# Patient Record
Sex: FEMALE | Race: WHITE | NOT HISPANIC OR LATINO | URBAN - METROPOLITAN AREA
[De-identification: names, ages, dates, MRNs, and addresses within clinical notes are randomized per-mention and may not be internally consistent; named-entity substitution may affect disease eponyms.]

---

## 2017-02-08 ENCOUNTER — EMERGENCY (EMERGENCY)
Facility: HOSPITAL | Age: 37
LOS: 1 days | Discharge: HOME | End: 2017-02-08
Attending: EMERGENCY MEDICINE
Payer: MEDICAID

## 2017-02-08 VITALS
DIASTOLIC BLOOD PRESSURE: 79 MMHG | HEART RATE: 92 BPM | RESPIRATION RATE: 20 BRPM | TEMPERATURE: 98 F | OXYGEN SATURATION: 98 % | SYSTOLIC BLOOD PRESSURE: 116 MMHG

## 2017-02-08 VITALS
OXYGEN SATURATION: 98 % | TEMPERATURE: 98 F | DIASTOLIC BLOOD PRESSURE: 100 MMHG | HEIGHT: 59 IN | SYSTOLIC BLOOD PRESSURE: 133 MMHG | HEART RATE: 105 BPM | WEIGHT: 130.07 LBS | RESPIRATION RATE: 982 BRPM

## 2017-02-08 DIAGNOSIS — G40.909 EPILEPSY, UNSPECIFIED, NOT INTRACTABLE, WITHOUT STATUS EPILEPTICUS: ICD-10-CM

## 2017-02-08 DIAGNOSIS — Z85.41 PERSONAL HISTORY OF MALIGNANT NEOPLASM OF CERVIX UTERI: ICD-10-CM

## 2017-02-08 DIAGNOSIS — K80.50 CALCULUS OF BILE DUCT WITHOUT CHOLANGITIS OR CHOLECYSTITIS WITHOUT OBSTRUCTION: ICD-10-CM

## 2017-02-08 DIAGNOSIS — Z59.0 HOMELESSNESS: ICD-10-CM

## 2017-02-08 DIAGNOSIS — K29.70 GASTRITIS, UNSPECIFIED, WITHOUT BLEEDING: ICD-10-CM

## 2017-02-08 DIAGNOSIS — Z91.14 PATIENT'S OTHER NONCOMPLIANCE WITH MEDICATION REGIMEN: ICD-10-CM

## 2017-02-08 DIAGNOSIS — Z88.2 ALLERGY STATUS TO SULFONAMIDES: ICD-10-CM

## 2017-02-08 LAB
ALBUMIN SERPL ELPH-MCNC: 4.3 G/DL — SIGNIFICANT CHANGE UP (ref 3.5–5)
ALP SERPL-CCNC: 110 U/L — SIGNIFICANT CHANGE UP (ref 40–120)
ALT FLD-CCNC: 15 U/L DA — SIGNIFICANT CHANGE UP (ref 10–60)
ANION GAP SERPL CALC-SCNC: 10 MMOL/L — SIGNIFICANT CHANGE UP (ref 5–17)
APPEARANCE UR: CLEAR — SIGNIFICANT CHANGE UP
AST SERPL-CCNC: 11 U/L — SIGNIFICANT CHANGE UP (ref 10–40)
BASOPHILS # BLD AUTO: 0.1 K/UL — SIGNIFICANT CHANGE UP (ref 0–0.2)
BASOPHILS NFR BLD AUTO: 0.8 % — SIGNIFICANT CHANGE UP (ref 0–2)
BILIRUB SERPL-MCNC: 0.3 MG/DL — SIGNIFICANT CHANGE UP (ref 0.2–1.2)
BILIRUB UR-MCNC: NEGATIVE — SIGNIFICANT CHANGE UP
BUN SERPL-MCNC: 7 MG/DL — SIGNIFICANT CHANGE UP (ref 7–18)
CALCIUM SERPL-MCNC: 9.2 MG/DL — SIGNIFICANT CHANGE UP (ref 8.4–10.5)
CHLORIDE SERPL-SCNC: 109 MMOL/L — HIGH (ref 96–108)
CO2 SERPL-SCNC: 17 MMOL/L — LOW (ref 22–31)
COLOR SPEC: YELLOW — SIGNIFICANT CHANGE UP
CREAT SERPL-MCNC: 0.97 MG/DL — SIGNIFICANT CHANGE UP (ref 0.5–1.3)
DIFF PNL FLD: NEGATIVE — SIGNIFICANT CHANGE UP
EOSINOPHIL # BLD AUTO: 0.4 K/UL — SIGNIFICANT CHANGE UP (ref 0–0.5)
EOSINOPHIL NFR BLD AUTO: 3.5 % — SIGNIFICANT CHANGE UP (ref 0–6)
GLUCOSE SERPL-MCNC: 89 MG/DL — SIGNIFICANT CHANGE UP (ref 70–99)
GLUCOSE UR QL: NEGATIVE — SIGNIFICANT CHANGE UP
HCG SERPL-ACNC: <1 MIU/ML — SIGNIFICANT CHANGE UP
HCT VFR BLD CALC: 43.6 % — SIGNIFICANT CHANGE UP (ref 34.5–45)
HGB BLD-MCNC: 14.3 G/DL — SIGNIFICANT CHANGE UP (ref 11.5–15.5)
KETONES UR-MCNC: NEGATIVE — SIGNIFICANT CHANGE UP
LEUKOCYTE ESTERASE UR-ACNC: ABNORMAL
LIDOCAIN IGE QN: 98 U/L — SIGNIFICANT CHANGE UP (ref 73–393)
LYMPHOCYTES # BLD AUTO: 18.4 % — SIGNIFICANT CHANGE UP (ref 13–44)
LYMPHOCYTES # BLD AUTO: 2.2 K/UL — SIGNIFICANT CHANGE UP (ref 1–3.3)
MCHC RBC-ENTMCNC: 29.1 PG — SIGNIFICANT CHANGE UP (ref 27–34)
MCHC RBC-ENTMCNC: 32.8 GM/DL — SIGNIFICANT CHANGE UP (ref 32–36)
MCV RBC AUTO: 88.7 FL — SIGNIFICANT CHANGE UP (ref 80–100)
MONOCYTES # BLD AUTO: 0.7 K/UL — SIGNIFICANT CHANGE UP (ref 0–0.9)
MONOCYTES NFR BLD AUTO: 6.1 % — SIGNIFICANT CHANGE UP (ref 2–14)
NEUTROPHILS # BLD AUTO: 8.6 K/UL — HIGH (ref 1.8–7.4)
NEUTROPHILS NFR BLD AUTO: 71.2 % — SIGNIFICANT CHANGE UP (ref 43–77)
NITRITE UR-MCNC: NEGATIVE — SIGNIFICANT CHANGE UP
PH UR: 7 — SIGNIFICANT CHANGE UP (ref 4.8–8)
PLATELET # BLD AUTO: 326 K/UL — SIGNIFICANT CHANGE UP (ref 150–400)
POTASSIUM SERPL-MCNC: 3.7 MMOL/L — SIGNIFICANT CHANGE UP (ref 3.5–5.3)
POTASSIUM SERPL-SCNC: 3.7 MMOL/L — SIGNIFICANT CHANGE UP (ref 3.5–5.3)
PROT SERPL-MCNC: 8.5 G/DL — HIGH (ref 6–8.3)
PROT UR-MCNC: NEGATIVE — SIGNIFICANT CHANGE UP
RBC # BLD: 4.92 M/UL — SIGNIFICANT CHANGE UP (ref 3.8–5.2)
RBC # FLD: 12.6 % — SIGNIFICANT CHANGE UP (ref 10.3–14.5)
SODIUM SERPL-SCNC: 136 MMOL/L — SIGNIFICANT CHANGE UP (ref 135–145)
SP GR SPEC: 1.01 — SIGNIFICANT CHANGE UP (ref 1.01–1.02)
UROBILINOGEN FLD QL: NEGATIVE — SIGNIFICANT CHANGE UP
WBC # BLD: 12.1 K/UL — HIGH (ref 3.8–10.5)
WBC # FLD AUTO: 12.1 K/UL — HIGH (ref 3.8–10.5)

## 2017-02-08 PROCEDURE — 99285 EMERGENCY DEPT VISIT HI MDM: CPT

## 2017-02-08 PROCEDURE — 99284 EMERGENCY DEPT VISIT MOD MDM: CPT | Mod: 25

## 2017-02-08 PROCEDURE — 96376 TX/PRO/DX INJ SAME DRUG ADON: CPT

## 2017-02-08 PROCEDURE — 76705 ECHO EXAM OF ABDOMEN: CPT

## 2017-02-08 PROCEDURE — 83690 ASSAY OF LIPASE: CPT

## 2017-02-08 PROCEDURE — 84702 CHORIONIC GONADOTROPIN TEST: CPT

## 2017-02-08 PROCEDURE — 96374 THER/PROPH/DIAG INJ IV PUSH: CPT

## 2017-02-08 PROCEDURE — 85027 COMPLETE CBC AUTOMATED: CPT

## 2017-02-08 PROCEDURE — 74176 CT ABD & PELVIS W/O CONTRAST: CPT

## 2017-02-08 PROCEDURE — 96375 TX/PRO/DX INJ NEW DRUG ADDON: CPT

## 2017-02-08 PROCEDURE — 80053 COMPREHEN METABOLIC PANEL: CPT

## 2017-02-08 PROCEDURE — 76705 ECHO EXAM OF ABDOMEN: CPT | Mod: 26

## 2017-02-08 PROCEDURE — 81001 URINALYSIS AUTO W/SCOPE: CPT

## 2017-02-08 PROCEDURE — 74176 CT ABD & PELVIS W/O CONTRAST: CPT | Mod: 26

## 2017-02-08 RX ORDER — MORPHINE SULFATE 50 MG/1
4 CAPSULE, EXTENDED RELEASE ORAL ONCE
Qty: 0 | Refills: 0 | Status: DISCONTINUED | OUTPATIENT
Start: 2017-02-08 | End: 2017-02-08

## 2017-02-08 RX ORDER — LEVETIRACETAM 250 MG/1
1 TABLET, FILM COATED ORAL
Qty: 0 | Refills: 0 | COMMUNITY

## 2017-02-08 RX ORDER — LAMOTRIGINE 25 MG/1
0 TABLET, ORALLY DISINTEGRATING ORAL
Qty: 0 | Refills: 0 | COMMUNITY

## 2017-02-08 RX ORDER — ONDANSETRON 8 MG/1
4 TABLET, FILM COATED ORAL ONCE
Qty: 0 | Refills: 0 | Status: COMPLETED | OUTPATIENT
Start: 2017-02-08 | End: 2017-02-08

## 2017-02-08 RX ORDER — FAMOTIDINE 10 MG/ML
0 INJECTION INTRAVENOUS
Qty: 0 | Refills: 0 | COMMUNITY

## 2017-02-08 RX ORDER — LEVETIRACETAM 250 MG/1
1000 TABLET, FILM COATED ORAL ONCE
Qty: 0 | Refills: 0 | Status: COMPLETED | OUTPATIENT
Start: 2017-02-08 | End: 2017-02-08

## 2017-02-08 RX ORDER — SODIUM CHLORIDE 9 MG/ML
1000 INJECTION INTRAMUSCULAR; INTRAVENOUS; SUBCUTANEOUS ONCE
Qty: 0 | Refills: 0 | Status: COMPLETED | OUTPATIENT
Start: 2017-02-08 | End: 2017-02-08

## 2017-02-08 RX ORDER — LAMOTRIGINE 25 MG/1
200 TABLET, ORALLY DISINTEGRATING ORAL ONCE
Qty: 0 | Refills: 0 | Status: COMPLETED | OUTPATIENT
Start: 2017-02-08 | End: 2017-02-08

## 2017-02-08 RX ORDER — TOPIRAMATE 25 MG
0 TABLET ORAL
Qty: 0 | Refills: 0 | COMMUNITY

## 2017-02-08 RX ADMIN — MORPHINE SULFATE 4 MILLIGRAM(S): 50 CAPSULE, EXTENDED RELEASE ORAL at 19:52

## 2017-02-08 RX ADMIN — LAMOTRIGINE 200 MILLIGRAM(S): 25 TABLET, ORALLY DISINTEGRATING ORAL at 20:09

## 2017-02-08 RX ADMIN — ONDANSETRON 4 MILLIGRAM(S): 8 TABLET, FILM COATED ORAL at 16:44

## 2017-02-08 RX ADMIN — SODIUM CHLORIDE 1000 MILLILITER(S): 9 INJECTION INTRAMUSCULAR; INTRAVENOUS; SUBCUTANEOUS at 16:02

## 2017-02-08 RX ADMIN — MORPHINE SULFATE 4 MILLIGRAM(S): 50 CAPSULE, EXTENDED RELEASE ORAL at 16:44

## 2017-02-08 RX ADMIN — ONDANSETRON 4 MILLIGRAM(S): 8 TABLET, FILM COATED ORAL at 19:52

## 2017-02-08 RX ADMIN — LEVETIRACETAM 400 MILLIGRAM(S): 250 TABLET, FILM COATED ORAL at 16:30

## 2017-02-08 SDOH — ECONOMIC STABILITY - HOUSING INSECURITY: HOMELESSNESS: Z59.0
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
## 2017-02-08 NOTE — ED PROVIDER NOTE - MEDICAL DECISION MAKING DETAILS
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 Will order pain management, surgery f/u and reassess. Likely d/c. 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 Will order pain management,  repeat sono, surgery f/u and reassess. Likely d/c.

## 2017-02-08 NOTE — ED PROVIDER NOTE - NS ED MD SCRIBE ATTENDING SCRIBE SECTIONS
HISTORY OF PRESENT ILLNESS/VITAL SIGNS( Pullset)/HIV/DISPOSITION/REVIEW OF SYSTEMS/PAST MEDICAL/SURGICAL/SOCIAL HISTORY/PHYSICAL EXAM

## 2017-02-08 NOTE — ED PROVIDER NOTE - MUSCULOSKELETAL, MLM
Spine appears normal, range of motion is not limited, no muscle or joint tenderness. (-) no CVA tenderness

## 2017-02-16 ENCOUNTER — EMERGENCY (EMERGENCY)
Facility: HOSPITAL | Age: 37
LOS: 1 days | Discharge: HOME | End: 2017-02-16
Attending: EMERGENCY MEDICINE
Payer: MEDICAID

## 2017-02-16 VITALS
DIASTOLIC BLOOD PRESSURE: 89 MMHG | HEART RATE: 87 BPM | TEMPERATURE: 98 F | HEIGHT: 59 IN | OXYGEN SATURATION: 100 % | RESPIRATION RATE: 18 BRPM | SYSTOLIC BLOOD PRESSURE: 119 MMHG | WEIGHT: 119.93 LBS

## 2017-02-16 DIAGNOSIS — G40.909 EPILEPSY, UNSPECIFIED, NOT INTRACTABLE, WITHOUT STATUS EPILEPTICUS: ICD-10-CM

## 2017-02-16 DIAGNOSIS — F41.9 ANXIETY DISORDER, UNSPECIFIED: ICD-10-CM

## 2017-02-16 DIAGNOSIS — Z85.41 PERSONAL HISTORY OF MALIGNANT NEOPLASM OF CERVIX UTERI: ICD-10-CM

## 2017-02-16 DIAGNOSIS — Z88.1 ALLERGY STATUS TO OTHER ANTIBIOTIC AGENTS STATUS: ICD-10-CM

## 2017-02-16 PROCEDURE — 99283 EMERGENCY DEPT VISIT LOW MDM: CPT | Mod: 25

## 2017-02-16 PROCEDURE — 99053 MED SERV 10PM-8AM 24 HR FAC: CPT

## 2017-02-17 VITALS
HEART RATE: 83 BPM | OXYGEN SATURATION: 100 % | DIASTOLIC BLOOD PRESSURE: 89 MMHG | TEMPERATURE: 99 F | SYSTOLIC BLOOD PRESSURE: 120 MMHG | RESPIRATION RATE: 18 BRPM

## 2017-02-17 PROCEDURE — 99283 EMERGENCY DEPT VISIT LOW MDM: CPT

## 2017-02-17 RX ORDER — ONDANSETRON 8 MG/1
4 TABLET, FILM COATED ORAL ONCE
Qty: 0 | Refills: 0 | Status: COMPLETED | OUTPATIENT
Start: 2017-02-17 | End: 2017-02-17

## 2017-02-17 RX ORDER — DIAZEPAM 5 MG
2 TABLET ORAL ONCE
Qty: 0 | Refills: 0 | Status: DISCONTINUED | OUTPATIENT
Start: 2017-02-17 | End: 2017-02-17

## 2017-02-17 RX ADMIN — Medication 2 MILLIGRAM(S): at 01:47

## 2017-02-17 RX ADMIN — ONDANSETRON 4 MILLIGRAM(S): 8 TABLET, FILM COATED ORAL at 01:47

## 2017-02-17 NOTE — ED PROVIDER NOTE - OBJECTIVE STATEMENT
35 y/o F h/o anxiety presenting for panic attack. Denies headache, fever, chest pain, dyspnea, abdominal pain. States she currently lives in a very stressful situation. REquesting social work. Declining psych evaluation but would like information on local therapy options. REcently re-located from [place].

## 2017-05-25 ENCOUNTER — HOSPITAL ENCOUNTER (EMERGENCY)
Dept: HOSPITAL 72 - EMR | Age: 37
Discharge: HOME | End: 2017-05-25
Payer: COMMERCIAL

## 2017-05-25 VITALS — DIASTOLIC BLOOD PRESSURE: 74 MMHG | SYSTOLIC BLOOD PRESSURE: 126 MMHG

## 2017-05-25 VITALS — BODY MASS INDEX: 28.22 KG/M2 | HEIGHT: 59 IN | WEIGHT: 140 LBS

## 2017-05-25 DIAGNOSIS — F43.10: ICD-10-CM

## 2017-05-25 DIAGNOSIS — G40.909: ICD-10-CM

## 2017-05-25 DIAGNOSIS — Z76.0: ICD-10-CM

## 2017-05-25 DIAGNOSIS — R10.2: ICD-10-CM

## 2017-05-25 DIAGNOSIS — H72.91: Primary | ICD-10-CM

## 2017-05-25 DIAGNOSIS — J45.909: ICD-10-CM

## 2017-05-25 DIAGNOSIS — Z87.19: ICD-10-CM

## 2017-05-25 PROCEDURE — 99284 EMERGENCY DEPT VISIT MOD MDM: CPT

## 2017-05-25 NOTE — EMERGENCY ROOM REPORT
History of Present Illness


General


Chief Complaint:  Medication Refill





Present Illness


HPI


The patient is a 36-year-old female with history of seizure disorder and PTSD 

presenting for medication refill.  She states that she has recently moved here 

and has run out of medications.  She has been unable to establish a primary 

care doctor or psychiatrist.  Patient states that she needs Keppra and 

clonazepam.  She denies having any seizure recently or any other symptoms 

including fever, chills, nausea, vomiting, chest pain, shortness of breath, 

dizziness, anxiety, suicidal ideation


Allergies:  


Coded Allergies:  


     KETOROLAC TROMETHAMINE (Verified  Allergy, Mild, 11/19/12)


 NAUSEA


     PHENYTOIN SODIUM (Verified  Allergy, 11/19/12)


     PHENYTOIN SODIUM EXTENDED (Verified  Allergy, 11/19/12)





Patient History


Past Medical History:  see triage record


Pertinent Family History:  none


Pregnant Now:  No


Reviewed Nursing Documentation:  PMH: Agreed, PSxH: Agreed





Nursing Documentation-PMH


Hx Asthma:  Yes


Hx Gastrointestinal Problems:  Yes - VAGINAL PAIN


Hx Seizures:  Yes





Review of Systems


All Other Systems:  negative except mentioned in HPI





Physical Exam





Vital Signs








  Date Time  Temp Pulse Resp B/P Pulse Ox O2 Delivery O2 Flow Rate FiO2


 


5/25/17 14:39 98.4 88 20 122/78 98 Room Air  








Sp02 EP Interpretation:  reviewed, normal


General Appearance:  no apparent distress, alert, GCS 15, non-toxic


Head:  normocephalic, atraumatic


Eyes:  bilateral eye PERRL, bilateral eye normal inspection


ENT:  hearing grossly normal, normal pharynx, no angioedema, normal voice, 

other - R ear has small TM perforation. No erythema or bleeding


Neck:  full range of motion, supple/symm/no masses


Respiratory:  chest non-tender, lungs clear, normal breath sounds, speaking 

full sentences


Musculoskeletal:  back normal, gait/station normal, normal range of motion, non-

tender


Neurologic:  alert, oriented x3, responsive, motor strength/tone normal, 

sensory intact, speech normal


Psychiatric:  judgement/insight normal, memory normal, mood/affect normal, no 

suicidal/homicidal ideation


Skin:  normal color, no rash, warm/dry, well hydrated


Lymphatic:  no adenopathy





Medical Decision Making


PA Attestation


Dr. Gaxiola is my supervising physician. Patient management was discussed with 

my supervising physician


Diagnostic Impression:  


 Primary Impression:  


 Perforated tympanic membrane


 Qualified Codes:  H72.91 - Unspecified perforation of tympanic membrane, right 

ear


 Additional Impressions:  


 PTSD (post-traumatic stress disorder)


 Seizure disorder


ER Course


The patient is a 36-year-old female with history of seizure disorder and PTSD 

presenting for medication refill





Differential diagnoses considered but not limited to Anxiety, seizure disorder, 

suicidal ideation, homicidal ideation, depression





PE: No apparent distress. A&Ox4


PERRL. EOMI. 


Normal mentation. 


R ear TM has small mid perforation. No bleeding. No erythema. No bulging. No 

EAC edema. 


RRR. No MRG


Lungs CTA bilat


Abdomen: Normal appearance. Non distended. No ecchymosis. 


Normal BS. Non TTP. No McBurney point tenderness. No guarding. 


Skin is warm and dry, no rashes. 





The patient is given a limited refill of these medications and is informed she 

needs to establish care with a primary doctor as well as psychiatrist as soon 

as possible.





The patient will also be given a prescription for ofloxacin for incidental 

finding of right tympanic membrane perforation.





ER precautions given





Last Vital Signs








  Date Time  Temp Pulse Resp B/P Pulse Ox O2 Delivery O2 Flow Rate FiO2


 


5/25/17 15:44 98.3 92 19 126/74 99 Room Air  








Status:  improved


Disposition:  HOME, SELF-CARE


Condition:  Improved


Scripts


Ofloxacin (Floxin) 10 Ml Drops


10 DROP OT DAILY for 7 Days, #10 ML


   Prov: TERMAGDYAN,JACOB P.A.         5/25/17 


Clonazepam (CLONAZEPAM) 2 Mg Tablet


2 MG PO BID, #20 TAB


   Prov: TERZIAN,JACOB P.A.         5/25/17 


Levetiracetam (KEPPRA) 500 Mg Tablet


500 MG ORAL EVERY 12 HOURS, #60 TAB 0 Refills


   Prov: TERZIAN,JACOB P.A.         5/25/17


Referrals:  


[facility]


Patient Instructions:  Medicine Refill at the Emergency Department, Eardrum 

Perforation





Additional Instructions:  


I discussed my findings with the patient. All questions and concerns have been 

answered. Treatment and medication compliance have been addressed. I advised 

the patient that they need to follow up with PMD in 3-5 days. Return to ED if 

symptoms worsen, new symptoms arise, or if needed for any reason. Patient 

verbalized understanding of discharge instructions.





The patient is informed that she needs to follow up with primary doctor and 

also ENT as soon as possible











JACOB BARRETO May 25, 2017 21:52

## 2017-06-22 ENCOUNTER — HOSPITAL ENCOUNTER (EMERGENCY)
Dept: HOSPITAL 87 - ER | Age: 37
Discharge: HOME | End: 2017-06-22
Payer: MEDICAID

## 2017-06-22 VITALS — BODY MASS INDEX: 27.56 KG/M2 | WEIGHT: 136.69 LBS | HEIGHT: 59 IN

## 2017-06-22 VITALS — SYSTOLIC BLOOD PRESSURE: 104 MMHG | DIASTOLIC BLOOD PRESSURE: 66 MMHG

## 2017-06-22 DIAGNOSIS — N93.9: Primary | ICD-10-CM

## 2017-06-22 LAB
B-HCG SERPL-ACNC: (no result) MIU/ML (ref ?–3)
BASOPHILS NFR BLD AUTO: 0.4 % (ref 0–2)
CHLORIDE SERPL-SCNC: 108 MEQ/L (ref 98–107)
CO2 SERPL-SCNC: 29 MEQ/L (ref 21–32)
EOSINOPHIL NFR BLD AUTO: 3.3 % (ref 0–5)
ERYTHROCYTE [DISTWIDTH] IN BLOOD BY AUTOMATED COUNT: 13.4 % (ref 11.6–14.6)
HCT VFR BLD AUTO: 34.5 % (ref 36–48)
HGB BLD-MCNC: 11.5 G/DL (ref 12–16)
LYMPHOCYTES NFR BLD AUTO: 20.6 % (ref 20–50)
MCH RBC QN AUTO: 29.3 PG (ref 28–32)
MCV RBC AUTO: 88.1 FL (ref 81–99)
MONOCYTES NFR BLD AUTO: 5.5 % (ref 2–8)
NEUTROPHILS NFR BLD AUTO: 70.2 % (ref 40–76)
PLATELET # BLD AUTO: 249 X1000/UL (ref 130–400)
PMV BLD AUTO: 8.5 FL (ref 7.4–10.4)
RBC # BLD AUTO: 3.92 MILL/UL (ref 4.2–5.4)

## 2017-06-22 PROCEDURE — 85025 COMPLETE CBC W/AUTO DIFF WBC: CPT

## 2017-06-22 PROCEDURE — 36415 COLL VENOUS BLD VENIPUNCTURE: CPT

## 2017-06-22 PROCEDURE — 76801 OB US < 14 WKS SINGLE FETUS: CPT

## 2017-06-22 PROCEDURE — 80048 BASIC METABOLIC PNL TOTAL CA: CPT

## 2017-06-22 PROCEDURE — 84702 CHORIONIC GONADOTROPIN TEST: CPT

## 2017-06-22 PROCEDURE — 99285 EMERGENCY DEPT VISIT HI MDM: CPT
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
## 2019-02-12 ENCOUNTER — HOSPITAL ENCOUNTER (EMERGENCY)
Dept: HOSPITAL 54 - ER | Age: 39
LOS: 1 days | Discharge: PSYCHIATRIC OR SUBSTANCE USE FACILITY | End: 2019-02-13
Payer: COMMERCIAL

## 2019-02-12 VITALS — DIASTOLIC BLOOD PRESSURE: 89 MMHG | SYSTOLIC BLOOD PRESSURE: 120 MMHG

## 2019-02-12 VITALS — BODY MASS INDEX: 26.21 KG/M2 | WEIGHT: 130 LBS | HEIGHT: 59 IN

## 2019-02-12 DIAGNOSIS — Z85.41: ICD-10-CM

## 2019-02-12 DIAGNOSIS — R45.850: Primary | ICD-10-CM

## 2019-02-12 DIAGNOSIS — F41.9: ICD-10-CM

## 2019-02-12 LAB
ALBUMIN SERPL BCP-MCNC: 3.6 G/DL (ref 3.4–5)
ALP SERPL-CCNC: 96 U/L (ref 46–116)
ALT SERPL W P-5'-P-CCNC: 26 U/L (ref 12–78)
APAP SERPL-MCNC: < 2 UG/ML (ref 10–30)
AST SERPL W P-5'-P-CCNC: 14 U/L (ref 15–37)
BASOPHILS # BLD AUTO: 0 /CMM (ref 0–0.2)
BASOPHILS NFR BLD AUTO: 0.3 % (ref 0–2)
BILIRUB DIRECT SERPL-MCNC: 0 MG/DL (ref 0–0.2)
BILIRUB SERPL-MCNC: 0.3 MG/DL (ref 0.2–1)
BUN SERPL-MCNC: 12 MG/DL (ref 7–18)
CALCIUM SERPL-MCNC: 8.9 MG/DL (ref 8.5–10.1)
CHLORIDE SERPL-SCNC: 102 MMOL/L (ref 98–107)
CO2 SERPL-SCNC: 26 MMOL/L (ref 21–32)
CREAT SERPL-MCNC: 0.7 MG/DL (ref 0.6–1.3)
EOSINOPHIL NFR BLD AUTO: 0.5 % (ref 0–6)
ETHANOL SERPL-MCNC: < 3 MG/DL (ref 0–0)
GLUCOSE SERPL-MCNC: 112 MG/DL (ref 74–106)
HCT VFR BLD AUTO: 39 % (ref 33–45)
HGB BLD-MCNC: 12.9 G/DL (ref 11.5–14.8)
LYMPHOCYTES NFR BLD AUTO: 1.8 /CMM (ref 0.8–4.8)
LYMPHOCYTES NFR BLD AUTO: 13.6 % (ref 20–44)
MCHC RBC AUTO-ENTMCNC: 33 G/DL (ref 31–36)
MCV RBC AUTO: 87 FL (ref 82–100)
MONOCYTES NFR BLD AUTO: 0.9 /CMM (ref 0.1–1.3)
MONOCYTES NFR BLD AUTO: 6.7 % (ref 2–12)
NEUTROPHILS # BLD AUTO: 10.3 /CMM (ref 1.8–8.9)
NEUTROPHILS NFR BLD AUTO: 78.9 % (ref 43–81)
PH UR STRIP: 6.5 [PH] (ref 5–8)
PLATELET # BLD AUTO: 278 /CMM (ref 150–450)
POTASSIUM SERPL-SCNC: 3.5 MMOL/L (ref 3.5–5.1)
PROT SERPL-MCNC: 7.2 G/DL (ref 6.4–8.2)
RBC # BLD AUTO: 4.43 MIL/UL (ref 4–5.2)
SALICYLATES SERPL-MCNC: 1.2 MG/DL (ref 2.8–20)
SODIUM SERPL-SCNC: 136 MMOL/L (ref 136–145)
UROBILINOGEN UR STRIP-MCNC: 0.2 EU/DL
WBC NRBC COR # BLD AUTO: 13 K/UL (ref 4.3–11)

## 2019-02-12 PROCEDURE — G0480 DRUG TEST DEF 1-7 CLASSES: HCPCS

## 2019-02-12 NOTE — NUR
PT STATED THAT SHE TAKES KEPPA, CELEXA, SEROQUEL, AND KLONIPIN ON A DAILY BASIS 
AND STOPPED TAKING THEM 2 DAYS AGO. MD NOTIFIED.
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
## 2019-02-13 NOTE — NUR
PT TRANSPORTED TO [facility] VIA [facility]. REPORT GIVEN TO EMT AND COPY OF 
CHART GIVEN TO EMT FOR TRANSPORT TO PSYCH FACILITY.

## 2020-06-22 ENCOUNTER — HOSPITAL ENCOUNTER (EMERGENCY)
Dept: HOSPITAL 87 - ER | Age: 40
LOS: 1 days | Discharge: HOME | End: 2020-06-23
Payer: MEDICAID

## 2020-06-22 VITALS — HEIGHT: 59 IN | BODY MASS INDEX: 31.11 KG/M2 | WEIGHT: 154.32 LBS

## 2020-06-22 DIAGNOSIS — J40: Primary | ICD-10-CM

## 2020-06-22 DIAGNOSIS — Z88.8: ICD-10-CM

## 2020-06-22 DIAGNOSIS — Z98.890: ICD-10-CM

## 2020-06-22 DIAGNOSIS — R00.0: ICD-10-CM

## 2020-06-22 PROCEDURE — 71045 X-RAY EXAM CHEST 1 VIEW: CPT

## 2020-06-22 PROCEDURE — 94640 AIRWAY INHALATION TREATMENT: CPT

## 2020-06-22 PROCEDURE — 93005 ELECTROCARDIOGRAM TRACING: CPT

## 2020-06-22 PROCEDURE — 81025 URINE PREGNANCY TEST: CPT

## 2020-06-22 PROCEDURE — 99283 EMERGENCY DEPT VISIT LOW MDM: CPT

## 2020-06-23 VITALS — SYSTOLIC BLOOD PRESSURE: 128 MMHG | DIASTOLIC BLOOD PRESSURE: 75 MMHG

## 2020-11-07 ENCOUNTER — HOSPITAL ENCOUNTER (EMERGENCY)
Dept: HOSPITAL 72 - EMR | Age: 40
Discharge: HOME | End: 2020-11-07
Payer: COMMERCIAL

## 2020-11-07 VITALS — SYSTOLIC BLOOD PRESSURE: 120 MMHG | DIASTOLIC BLOOD PRESSURE: 78 MMHG

## 2020-11-07 VITALS — SYSTOLIC BLOOD PRESSURE: 129 MMHG | DIASTOLIC BLOOD PRESSURE: 89 MMHG

## 2020-11-07 VITALS — BODY MASS INDEX: 32.25 KG/M2 | HEIGHT: 59 IN | WEIGHT: 160 LBS

## 2020-11-07 DIAGNOSIS — F41.9: ICD-10-CM

## 2020-11-07 DIAGNOSIS — J45.909: ICD-10-CM

## 2020-11-07 DIAGNOSIS — R06.09: Primary | ICD-10-CM

## 2020-11-07 DIAGNOSIS — D72.19: ICD-10-CM

## 2020-11-07 LAB
ADD MANUAL DIFF: NO
ALBUMIN SERPL-MCNC: 3.9 G/DL (ref 3.4–5)
ALBUMIN/GLOB SERPL: 1 {RATIO} (ref 1–2.7)
ALP SERPL-CCNC: 135 U/L (ref 46–116)
ALT SERPL-CCNC: 26 U/L (ref 12–78)
ANION GAP SERPL CALC-SCNC: 12 MMOL/L (ref 5–15)
APPEARANCE UR: (no result)
APTT BLD: 26 SEC (ref 23–33)
APTT PPP: (no result) S
AST SERPL-CCNC: 21 U/L (ref 15–37)
BASOPHILS NFR BLD AUTO: 1.1 % (ref 0–2)
BILIRUB SERPL-MCNC: 0.3 MG/DL (ref 0.2–1)
BUN SERPL-MCNC: 15 MG/DL (ref 7–18)
CALCIUM SERPL-MCNC: 8.8 MG/DL (ref 8.5–10.1)
CHLORIDE SERPL-SCNC: 104 MMOL/L (ref 98–107)
CK SERPL-CCNC: 38 U/L (ref 26–308)
CO2 SERPL-SCNC: 24 MMOL/L (ref 21–32)
CREAT SERPL-MCNC: 1.1 MG/DL (ref 0.55–1.3)
EOSINOPHIL NFR BLD AUTO: 4.2 % (ref 0–3)
ERYTHROCYTE [DISTWIDTH] IN BLOOD BY AUTOMATED COUNT: 12.8 % (ref 11.6–14.8)
GLOBULIN SER-MCNC: 3.8 G/DL
GLUCOSE UR STRIP-MCNC: NEGATIVE MG/DL
HCT VFR BLD CALC: 44.6 % (ref 37–47)
HGB BLD-MCNC: 14.4 G/DL (ref 12–16)
INR PPP: 0.9 (ref 0.9–1.1)
KETONES UR QL STRIP: NEGATIVE
LEUKOCYTE ESTERASE UR QL STRIP: (no result)
LYMPHOCYTES NFR BLD AUTO: 30.6 % (ref 20–45)
MCV RBC AUTO: 91 FL (ref 80–99)
MONOCYTES NFR BLD AUTO: 5.3 % (ref 1–10)
NEUTROPHILS NFR BLD AUTO: 58.9 % (ref 45–75)
NITRITE UR QL STRIP: NEGATIVE
PH UR STRIP: 6 [PH] (ref 4.5–8)
PLATELET # BLD: 256 K/UL (ref 150–450)
POTASSIUM SERPL-SCNC: 3.7 MMOL/L (ref 3.5–5.1)
PROT UR QL STRIP: NEGATIVE
RBC # BLD AUTO: 4.9 M/UL (ref 4.2–5.4)
SODIUM SERPL-SCNC: 140 MMOL/L (ref 136–145)
SP GR UR STRIP: 1.01 (ref 1–1.03)
UROBILINOGEN UR-MCNC: NORMAL MG/DL (ref 0–1)
WBC # BLD AUTO: 7.6 K/UL (ref 4.8–10.8)

## 2020-11-07 PROCEDURE — 99284 EMERGENCY DEPT VISIT MOD MDM: CPT

## 2020-11-07 PROCEDURE — 81025 URINE PREGNANCY TEST: CPT

## 2020-11-07 PROCEDURE — 85379 FIBRIN DEGRADATION QUANT: CPT

## 2020-11-07 PROCEDURE — 96375 TX/PRO/DX INJ NEW DRUG ADDON: CPT

## 2020-11-07 PROCEDURE — 83605 ASSAY OF LACTIC ACID: CPT

## 2020-11-07 PROCEDURE — 96374 THER/PROPH/DIAG INJ IV PUSH: CPT

## 2020-11-07 PROCEDURE — 85610 PROTHROMBIN TIME: CPT

## 2020-11-07 PROCEDURE — 85025 COMPLETE CBC W/AUTO DIFF WBC: CPT

## 2020-11-07 PROCEDURE — 82550 ASSAY OF CK (CPK): CPT

## 2020-11-07 PROCEDURE — 36415 COLL VENOUS BLD VENIPUNCTURE: CPT

## 2020-11-07 PROCEDURE — 87086 URINE CULTURE/COLONY COUNT: CPT

## 2020-11-07 PROCEDURE — 84484 ASSAY OF TROPONIN QUANT: CPT

## 2020-11-07 PROCEDURE — 81003 URINALYSIS AUTO W/O SCOPE: CPT

## 2020-11-07 PROCEDURE — 85730 THROMBOPLASTIN TIME PARTIAL: CPT

## 2020-11-07 PROCEDURE — 83880 ASSAY OF NATRIURETIC PEPTIDE: CPT

## 2020-11-07 PROCEDURE — 71045 X-RAY EXAM CHEST 1 VIEW: CPT

## 2020-11-07 PROCEDURE — 93005 ELECTROCARDIOGRAM TRACING: CPT

## 2020-11-07 PROCEDURE — 80053 COMPREHEN METABOLIC PANEL: CPT

## 2020-11-07 NOTE — EMERGENCY ROOM REPORT
History of Present Illness


General


Chief Complaint:  Dyspnea/Respdistress


Source:  Patient





Present Illness


HPI


The patient presents with increased dyspnea.  This is been progressing over the 

last few months.  She was admitted and diagnosed with pneumonia and treated with

steroids, antibiotics and also beta agonists in February.  She is never returned

to normal since that time.  She has episodes of dyspnea that are occasionally 

improved with albuterol.  She also reports that her doctors are concerned about 

possible sleep apnea.  Last night she had an episode where she had an episode 

that she thought might have been related to sleep apnea and used her inhaler.  

Just prior to coming in she took 5 puffs of her albuterol inhaler.  She feels 

tightness in her chest still.  She also complains about tingling in her hands 

and feet.  She denies chest pain per se but describes pressure.  She does feel 

anxious.  She denies fevers, chills, productive cough, sore throat.  She has 

mild nausea without vomiting.  She states she has an appointment with pulmonary 

doctor coming up.  Prior smoker.  She has been taking montelukast.  





History of seizures on Keppra and clonazepam.





She denies exposure to COVID-19 positive contacts.





No diarrhea, dysuria, abdominal pain, joint pain, rashes, visual changes, 

dizziness, headache.


Allergies:  


Coded Allergies:  


     No Known Allergies (Unverified , 11/7/20)





COVID-19 Screening


Contact w/high risk pt:  No


Experienced COVID-19 symptoms?:  No


COVID-19 Testing performed PTA:  No





Patient History


Past Medical History:  see triage record


Social History:  Denies: smoking - Prior


Social History Narrative


From home came by [employer]


Last Menstrual Period:  11/03/20


Pregnant Now:  No


Reviewed Nursing Documentation:  PMH: Agreed; PSxH: Agreed





Nursing Documentation-PMH


Hx Asthma:  Yes


Hx Gastrointestinal Problems:  Yes


Hx Seizures:  Yes





Review of Systems


All Other Systems:  negative except mentioned in HPI





Physical Exam





Vital Signs








  Date Time  Temp Pulse Resp B/P (MAP) Pulse Ox O2 Delivery O2 Flow Rate FiO2


 


11/7/20 08:55 98.4 111 19 129/89 (102) 95 Room Air  








Sp02 EP Interpretation:  reviewed, abnormal - Review is slightly low by me


General Appearance:  well appearing, GCS 15, non-toxic, other - Anxious


Head:  normocephalic


Eyes:  bilateral eye normal inspection


ENT:  normal pharynx, moist mucus membranes


Neck:  supple


Respiratory:  chest non-tender, lungs clear, normal breath sounds


Cardiovascular #1:  regular rate, rhythm, no edema


Cardiovascular #2:  2+ radial (R)


Gastrointestinal:  normal inspection, normal bowel sounds, non tender, no mass, 

non-distended, overweight


Genitourinary:  no CVA tenderness


Musculoskeletal:  back normal, normal range of motion, no calf tenderness, g

ait/station normal


Neurologic:  alert, oriented x3, grossly normal


Psychiatric:  anxious


Skin:  no rash, warm/dry





Medical Decision Making


Diagnostic Impression:  


   Primary Impression:  


   Dyspnea


   Qualified Codes:  R06.09 - Other forms of dyspnea


   Additional Impressions:  


   Eosinophilia


   Qualified Codes:  D72.19 - Other eosinophilia


   Asthma


   Qualified Codes:  J45.20 - Mild intermittent asthma, uncomplicated


   Anxiety


   Suspected sleep apnea


ER Course


Patient presents with dyspnea.  Differential includes asthma exacerbation, acute

myocardial infarction, pulmonary embolus, bronchitis, anxiety/hyperventilation, 

electrolyte imbalance amongst others.  Complicated patient.  Evaluation with 

EKG, chest x-ray and labs.  Lungs are clear at this time.  Patient given Solu-

Medrol.





EKG normal sinus rhythm.  Chest x-ray no infiltrates.  Labs significant for 

normal white count with eosinophilia.  D-dimer negative.  CMP unremarkable.  

Urinalysis with white blood cells however appears contaminated specimen.





Patient complaining of anxiety and jitteriness.  Benadryl ordered.





Patient still complaining about anxiety and jitteriness.  Ativan ordered.





Patient improved after Ativan.  Exercised and lungs still clear.





Discussed assessment and findings with patient.  Dyspnea appears multifactorial.

 Discussed treatment plan with patient.  Discussed the importance of follow-up 

with her pulmonary specialist.  She is concerned about the possibility of 

emphysema due to years of smoking.





No medical emergency at this time.  Patient improved.  Patient stable for 

outpatient observation and treatment.





Laboratory Tests








Test


 11/7/20


09:20 11/7/20


09:25


 


Urine Color Pale yellow   


 


Urine Appearance


 Slightly


cloudy 





 


Urine pH 6 (4.5-8.0)   


 


Urine Specific Gravity


 1.015


(1.005-1.035) 





 


Urine Protein


 Negative


(NEGATIVE) 





 


Urine Glucose (UA)


 Negative


(NEGATIVE) 





 


Urine Ketones


 Negative


(NEGATIVE) 





 


Urine Blood


 2+ (NEGATIVE)


H 





 


Urine Nitrite


 Negative


(NEGATIVE) 





 


Urine Bilirubin


 Negative


(NEGATIVE) 





 


Urine Urobilinogen


 Normal MG/DL


(0.0-1.0) 





 


Urine Leukocyte Esterase


 3+ (NEGATIVE)


H 





 


Urine RBC


 2-4 /HPF (0 -


2)  H 





 


Urine WBC


 15-20 /HPF (0


- 2)  H 





 


Urine Squamous Epithelial


Cells Many /LPF


(NONE/OCC)  H 





 


Urine Bacteria


 Few /HPF


(NONE) 





 


Urine HCG, Qualitative


 Negative


(NEGATIVE) 





 


White Blood Count


 


 7.6 K/UL


(4.8-10.8)


 


Red Blood Count


 


 4.90 M/UL


(4.20-5.40)


 


Hemoglobin


 


 14.4 G/DL


(12.0-16.0)


 


Hematocrit


 


 44.6 %


(37.0-47.0)


 


Mean Corpuscular Volume  91 FL (80-99)  


 


Mean Corpuscular Hemoglobin


 


 29.3 PG


(27.0-31.0)


 


Mean Corpuscular Hemoglobin


Concent 


 32.2 G/DL


(32.0-36.0)


 


Red Cell Distribution Width


 


 12.8 %


(11.6-14.8)


 


Platelet Count


 


 256 K/UL


(150-450)


 


Mean Platelet Volume


 


 9.0 FL


(6.5-10.1)


 


Neutrophils (%) (Auto)


 


 58.9 %


(45.0-75.0)


 


Lymphocytes (%) (Auto)


 


 30.6 %


(20.0-45.0)


 


Monocytes (%) (Auto)


 


 5.3 %


(1.0-10.0)


 


Eosinophils (%) (Auto)


 


 4.2 %


(0.0-3.0)  H


 


Basophils (%) (Auto)


 


 1.1 %


(0.0-2.0)


 


Prothrombin Time


 


 10.4 SEC


(9.30-11.50)


 


Prothrombin Time INR  0.9 (0.9-1.1)  


 


Activated Partial


Thromboplast Time 


 26 SEC (23-33)





 


D-Dimer


 


 0.47 mg/L FEU


(0.00-0.49)


 


Sodium Level


 


 140 MMOL/L


(136-145)


 


Potassium Level


 


 3.7 MMOL/L


(3.5-5.1)


 


Chloride Level


 


 104 MMOL/L


()


 


Carbon Dioxide Level


 


 24 MMOL/L


(21-32)


 


Anion Gap


 


 12 mmol/L


(5-15)


 


Blood Urea Nitrogen


 


 15 mg/dL


(7-18)


 


Creatinine


 


 1.1 MG/DL


(0.55-1.30)


 


Estimated Glomerular


Filtration Rate 


 55.0 mL/min


(>60)


 


Glucose Level


 


 136 MG/DL


()  H


 


Lactic Acid Level


 


 1.70 mmol/L


(0.4-2.0)


 


Calcium Level


 


 8.8 MG/DL


(8.5-10.1)


 


Total Bilirubin


 


 0.3 MG/DL


(0.2-1.0)


 


Aspartate Amino Transferase


(AST) 


 21 U/L (15-37)





 


Alanine Aminotransferase (ALT)


 


 26 U/L (12-78)





 


Alkaline Phosphatase


 


 135 U/L


()  H


 


Total Creatine Kinase


 


 38 U/L


()


 


Troponin I


 


 0.000 ng/mL


(0.000-0.056)


 


Pro-B-Type Natriuretic Peptide


 


 12 pg/mL


(0-125)


 


Total Protein


 


 7.7 G/DL


(6.4-8.2)


 


Albumin


 


 3.9 G/DL


(3.4-5.0)


 


Globulin  3.8 g/dL  


 


Albumin/Globulin Ratio  1.0 (1.0-2.7)  








Microbiology








 Date/Time


Source Procedure


Growth Status





 


 11/7/20 09:25


Nasopharynx SARS-CoV-2 RdRp Gene Assay - Final Complete








EKG Diagnostic Results


Rate:  normal


Rhythm:  NSR


ST Segments:  no acute changes





Rhythm Strip Diag. Results


EP Interpretation:  yes


Rhythm:  NSR, no PVC's, no ectopy





Chest X-Ray Diagnostic Results


Chest X-Ray Diagnostic Results :  


   Chest X-Ray Ordered:  Yes


   # of Views/Limited/Complete:  1 View


   Indication:  Shortness of Breath


   EP Interpretation:  Yes


   Interpretation:  no consolidation, no effusion, no pneumothorax


   Impression:  No acute disease





Last Vital Signs








  Date Time  Temp Pulse Resp B/P (MAP) Pulse Ox O2 Delivery O2 Flow Rate FiO2


 


11/7/20 12:16 98.4 95 19 120/78 100 Room Air  








Status:  improved


Disposition:  HOME, SELF-CARE


Condition:  Improved


Scripts


Lorazepam* (ATIVAN*) 1 Mg Tablet


1 MG ORAL THREE TIMES A DAY, #5 TAB


   Take less than 4 times a week.  Try 1/2 tablet at a time.


   Prov: Dominic Gaxiola MD         11/7/20 


Prednisone* (PREDNISONE*) 20 Mg Tablet


40 MG ORAL DAILY, #10 TAB


   Prov: Dominic Gaxiola MD         11/7/20 


Beclomethasone Dipropionate 40MCG Oral Inh (QVAR 40*) 7.3 Gm Aer.w.adap


2 PUFFS INH TWICE A DAY, #1 GM 0 Refills


   Taking more will not help.


   Prov: Dominic Gaxiola MD         11/7/20


Referrals:  


NON PHYSICIAN (PCP)











Dominic Gaxiola MD                 Nov 7, 2020 12:00

## 2020-11-07 NOTE — DIAGNOSTIC IMAGING REPORT
EXAM:

  XR Chest, 1 View

 

CLINICAL HISTORY:

  DYSPNEA

 

TECHNIQUE:

  Frontal view of the chest.

 

COMPARISON:

  None

 

FINDINGS:

  Hardware: None.

 

  Lungs/pleura: Normal. No focal consolidation. No pleural effusion or 

pneumothorax.

 

  Heart/mediastinum: Normal. No cardiomegaly.

 

  Soft tissues: Unremarkable.

 

  Bones: No acute fracture.

 

  Upper abdomen: Normal.

 

IMPRESSION:   

  No acute disease identified.

## 2020-12-31 ENCOUNTER — HOSPITAL ENCOUNTER (EMERGENCY)
Dept: HOSPITAL 72 - EMR | Age: 40
Discharge: HOME | End: 2020-12-31
Payer: MEDICAID

## 2020-12-31 VITALS — SYSTOLIC BLOOD PRESSURE: 109 MMHG | DIASTOLIC BLOOD PRESSURE: 88 MMHG

## 2020-12-31 VITALS — WEIGHT: 130 LBS | BODY MASS INDEX: 23.92 KG/M2 | HEIGHT: 62 IN

## 2020-12-31 VITALS — DIASTOLIC BLOOD PRESSURE: 88 MMHG | SYSTOLIC BLOOD PRESSURE: 109 MMHG

## 2020-12-31 VITALS — DIASTOLIC BLOOD PRESSURE: 85 MMHG | SYSTOLIC BLOOD PRESSURE: 106 MMHG

## 2020-12-31 DIAGNOSIS — F15.10: ICD-10-CM

## 2020-12-31 DIAGNOSIS — Z53.29: ICD-10-CM

## 2020-12-31 DIAGNOSIS — F41.9: ICD-10-CM

## 2020-12-31 DIAGNOSIS — G40.909: Primary | ICD-10-CM

## 2020-12-31 DIAGNOSIS — Z79.899: ICD-10-CM

## 2020-12-31 DIAGNOSIS — J45.909: ICD-10-CM

## 2020-12-31 LAB
ADD MANUAL DIFF: NO
ALBUMIN SERPL-MCNC: 3.3 G/DL (ref 3.4–5)
ALBUMIN/GLOB SERPL: 1 {RATIO} (ref 1–2.7)
ALP SERPL-CCNC: 114 U/L (ref 46–116)
ALT SERPL-CCNC: 25 U/L (ref 12–78)
ANION GAP SERPL CALC-SCNC: 9 MMOL/L (ref 5–15)
AST SERPL-CCNC: 24 U/L (ref 15–37)
BASOPHILS NFR BLD AUTO: 1.5 % (ref 0–2)
BILIRUB SERPL-MCNC: 0.2 MG/DL (ref 0.2–1)
BUN SERPL-MCNC: 11 MG/DL (ref 7–18)
CALCIUM SERPL-MCNC: 8.5 MG/DL (ref 8.5–10.1)
CHLORIDE SERPL-SCNC: 105 MMOL/L (ref 98–107)
CK SERPL-CCNC: 34 U/L (ref 26–308)
CO2 SERPL-SCNC: 23 MMOL/L (ref 21–32)
CREAT SERPL-MCNC: 1 MG/DL (ref 0.55–1.3)
EOSINOPHIL NFR BLD AUTO: 5.1 % (ref 0–3)
ERYTHROCYTE [DISTWIDTH] IN BLOOD BY AUTOMATED COUNT: 13.2 % (ref 11.6–14.8)
GLOBULIN SER-MCNC: 3.4 G/DL
HCT VFR BLD CALC: 44.1 % (ref 37–47)
HGB BLD-MCNC: 15 G/DL (ref 12–16)
LYMPHOCYTES NFR BLD AUTO: 39.3 % (ref 20–45)
MCV RBC AUTO: 87 FL (ref 80–99)
MONOCYTES NFR BLD AUTO: 5.2 % (ref 1–10)
NEUTROPHILS NFR BLD AUTO: 48.8 % (ref 45–75)
PLATELET # BLD: 266 K/UL (ref 150–450)
POTASSIUM SERPL-SCNC: 3.9 MMOL/L (ref 3.5–5.1)
RBC # BLD AUTO: 5.08 M/UL (ref 4.2–5.4)
SODIUM SERPL-SCNC: 137 MMOL/L (ref 136–145)
WBC # BLD AUTO: 8.7 K/UL (ref 4.8–10.8)

## 2020-12-31 PROCEDURE — 80307 DRUG TEST PRSMV CHEM ANLYZR: CPT

## 2020-12-31 PROCEDURE — 99284 EMERGENCY DEPT VISIT MOD MDM: CPT

## 2020-12-31 PROCEDURE — 82550 ASSAY OF CK (CPK): CPT

## 2020-12-31 PROCEDURE — 84484 ASSAY OF TROPONIN QUANT: CPT

## 2020-12-31 PROCEDURE — 96375 TX/PRO/DX INJ NEW DRUG ADDON: CPT

## 2020-12-31 PROCEDURE — 80053 COMPREHEN METABOLIC PANEL: CPT

## 2020-12-31 PROCEDURE — 93005 ELECTROCARDIOGRAM TRACING: CPT

## 2020-12-31 PROCEDURE — 96361 HYDRATE IV INFUSION ADD-ON: CPT

## 2020-12-31 PROCEDURE — 36415 COLL VENOUS BLD VENIPUNCTURE: CPT

## 2020-12-31 PROCEDURE — 96374 THER/PROPH/DIAG INJ IV PUSH: CPT

## 2020-12-31 PROCEDURE — 85025 COMPLETE CBC W/AUTO DIFF WBC: CPT

## 2020-12-31 PROCEDURE — 70450 CT HEAD/BRAIN W/O DYE: CPT

## 2020-12-31 RX ADMIN — KETOROLAC TROMETHAMINE ONE MG: 30 INJECTION, SOLUTION INTRAMUSCULAR; INTRAVENOUS at 16:55

## 2020-12-31 NOTE — NUR
ELOPEMENT:

pt. pulled out her IV line and said that she doesn't want to wait anymore and 
leaving now, ER PA notified, VSS, pt. is A/Ox4 ambulatory with steady gait

## 2020-12-31 NOTE — EMERGENCY ROOM REPORT
History of Present Illness


General


Chief Complaint:  Seizure


Source:  Patient





Present Illness


HPI


40-year-old female with history of petit mall seizures currently on Keppra, 

Neurontin, and Klonopin for anxiety that induces her seizures here complaining 

of multiple seizure episodes in the past week.  Patient reports that emotionally

she is very stressed out.  Patient has a therapist suggested her to go back on a

ntidepressant in addition to Klonopin.  Patient reports that her son is being 

raised by her parents and her son does not have a good relationship with her.  

Patient is asking for help how to talk to her son.  Denies chest pain, shortness

of breath, headache and dizziness at this time.  Denies head injury.  Reports 

that the latest seizure was this morning.  Patient denies incontinence.  Denies 

slurred speech.  Denies drug use, alcohol intake.  Patient is taking Keppra for 

her seizures.  Denies pregnancy.


Allergies:  


Coded Allergies:  


     No Known Allergies (Unverified , 11/7/20)





COVID-19 Screening


Contact w/high risk pt:  No


Experienced COVID-19 symptoms?:  No


COVID-19 Testing performed PTA:  No





Patient History


Past Medical History:  see triage record


Past Surgical History:  none


Pertinent Family History:  none


Last Menstrual Period:  unk


Pregnant Now:  No


Immunizations:  UTD


Reviewed Nursing Documentation:  PMH: Agreed; PSxH: Agreed





Nursing Documentation-PMH


Past Medical History:  No History, Except For


Hx Asthma:  Yes


Hx Gastrointestinal Problems:  Yes


Hx Seizures:  Yes





Review of Systems


All Other Systems:  negative except mentioned in HPI





Physical Exam





Vital Signs








  Date Time  Temp Pulse Resp B/P (MAP) Pulse Ox O2 Delivery O2 Flow Rate FiO2


 


12/31/20 15:03 98.2 115 16 129/75 (93) 98 Room Air  








Sp02 EP Interpretation:  reviewed, normal


General Appearance:  mild distress


Head:  normocephalic, atraumatic


Eyes:  bilateral eye normal inspection, bilateral eye PERRL


ENT:  hearing grossly normal, normal pharynx, no angioedema, normal voice


Neck:  full range of motion, supple/symm/no masses


Respiratory:  chest non-tender, lungs clear, normal breath sounds, no rhonchi, 

speaking full sentences


Cardiovascular #1:  regular rate, rhythm, no edema


Cardiovascular #2:  2+ carotid (R), 2+ carotid (L), 2+ radial (R), 2+ radial 

(L), 2+ dorsalis pedis (R), 2+ dorsalis pedis (L)


Gastrointestinal:  non tender, soft


Genitourinary:  no CVA tenderness


Musculoskeletal:  back normal, pelvis stable, gait/station normal


Neurologic:  alert, motor strength/tone normal, oriented x3, sensory intact, 

responsive, speech normal


Psychiatric:  judgement/insight normal, memory normal, no suicidal/homicidal 

ideation, anxious


Skin:  no rash


Lymphatic:  no adenopathy





Medical Decision Making


PA Attestation


 ALL Diagnosis and treatment plan reviewed and discussed with my supervising 

physician Dr. Angelo


Diagnostic Impression:  


   Primary Impression:  


   Seizure disorder


   Additional Impressions:  


   Anxiety


   Amphetamine abuse


   Left against medical advice


ER Course


40-year-old female with history of petit mall seizures currently on Keppra, 

Neurontin, and Klonopin for anxiety that induces her seizures here complaining 

of multiple seizure episodes in the past week.  Patient reports that emotionally

she is very stressed out.  Patient has a therapist suggested her to go back on 

antidepressant in addition to Klonopin.  Patient reports that her son is being 

raised by her parents and her son does not have a good relationship with her.  

Patient is asking for help how to talk to her son.  Denies chest pain, shortness

of breath, headache and dizziness at this time.  Denies head injury.  Reports 

that the latest seizure was this morning.  Patient denies incontinence.  Denies 

slurred speech.  Denies drug use, alcohol intake.  Patient is taking Keppra for 

her seizures.  Denies pregnancy.





Ddx considered but are not limited to: cerebral hematoma, concussion, skull 

fracture, head contusion, epilepsy, seizure,














Vital signs: are WNL, pt. is afebrile








 H&PE are most consistent with: Seizure, anxiety, amphetamine abuse, left 

AGAINST MEDICAL ADVICE














ORDERS: head CT no contrast, seizure order set














ED INTERVENTIONS: Toradol patient requested pain medication for overall body 

pain

















Patient left AGAINST MEDICAL ADVICE as the labs were not back yet patient was 

stable upon leaving.


EKG Diagnostic Results


Rate:  normal


Rhythm:  NSR


ST Segments:  no acute changes


Other Impression


No acute ST changes


ASA given to the pt in ED:  No





CT/MRI/US Diagnostic Results


CT/MRI/US Diagnostic Results :  


   Imaging Test Ordered:  Head CT no contrast


   Impression


 


Comparison: None.


 


Findings: No acute intracranial hemorrhage or edema. No mass effect or midline 

shift.


Normal gray-white differentiation. Normal size ventricles and extra axial CSF 

spaces.


The mastoids are clear. The calvarium is intact.


 


Impression: Negative





Last Vital Signs








  Date Time  Temp Pulse Resp B/P (MAP) Pulse Ox O2 Delivery O2 Flow Rate FiO2


 


12/31/20 15:03 98.2 115 16 129/75 (93) 98 Room Air  








Disposition:  AGAINST MEDICAL ADVICE


Condition:  Stable


Referrals:  


[facility],REFERRING (PCP)











Zahira Cowart      Dec 31, 2020 17:11

## 2020-12-31 NOTE — DIAGNOSTIC IMAGING REPORT
Indications: Headache, seizures morning

 

Technique: Spiral acquisitions obtained through the brain. Angled axial and coronal 5

x 5 mm slices were reconstructed. Total dose length product 934 mGycm.  CTDI vol(s)

53 mGy. Dose reduction achieved using automated exposure control

 

Comparison: None.

 

Findings: No acute intracranial hemorrhage or edema. No mass effect or midline shift.

Normal gray-white differentiation. Normal size ventricles and extra axial CSF spaces.

The mastoids are clear. The calvarium is intact.

 

Impression: Negative

 

 

 

 

 

The CT scanner at [facility] is accredited by the [employer]

[employer]iology and the scans are performed using protocols designed to limit radiation

exposure to as low as reasonably achievable to attain images of sufficient resolution

adequate for diagnostic evaluation.

## 2020-12-31 NOTE — NUR
ED Nurse Note:pt. was BIBA from home, s/p seizure, she is A/Ox4 on arrival, 
VSS, no SOB or fever, placed on cardiac monitor and seizure precaucions

## 2023-12-06 NOTE — ED PROVIDER NOTE - AREA
Unable to reach patient for call back after patient's follow up appointment with PCP.   LVM with call back number for patient to call if needed     
epigastric

## 2024-02-27 NOTE — ED PROVIDER NOTE - PROGRESS NOTE DETAILS
JOSY sono-neg, pt insists that she has "gallstone", & demands CT abd & surgical intervention.  D/w surg PA, Kieran, will see pt CT abd-neg, will d/c home, spoke to DEMETRIA Vila, but pt declines to speak to . just wants to speak to friend Mikael. Will call Mikael. CT abd-neg, pt ambulating without difficulty, will d/c home, spoke to DEMETRIA Vila, but pt declines to speak to . just wants to speak to friend Mikael. Will call Mikael. Pt claims she cannot swallow her antiseizure meds, but asks for Percocet & able to swallow Percocet Detail Level: Detailed Depth Of Biopsy: dermis Was A Bandage Applied: Yes Size Of Lesion In Cm: 0 Biopsy Type: H and E Biopsy Method: 10 blade Anesthesia Type: 1% lidocaine with epinephrine Anesthesia Volume In Cc: 0.5 Hemostasis: Electrocautery Wound Care: No ointment Dressing: Band-Aid Destruction After The Procedure: No Type Of Destruction Used: Curettage Curettage Text: The wound bed was treated with curettage after the biopsy was performed. Cryotherapy Text: The wound bed was treated with cryotherapy after the biopsy was performed. Electrodesiccation Text: The wound bed was treated with electrodesiccation after the biopsy was performed. Electrodesiccation And Curettage Text: The wound bed was treated with electrodesiccation and curettage after the biopsy was performed. Silver Nitrate Text: The wound bed was treated with silver nitrate after the biopsy was performed. Lab: [id number] Consent: Verbal consent was obtained and risks were reviewed including but not limited to scarring, infection, bleeding, scabbing, incomplete removal, nerve damage and allergy to anesthesia. Post-Care Instructions: I reviewed with the patient in detail post-care instructions. Patient is to keep the biopsy site dry overnight, and then apply plain vaseline daily until healed. Notification Instructions: Patient will be notified of biopsy results. However, patient instructed to call the office if not contacted within 2 weeks. Billing Type: Third-Party Bill Information: Selecting Yes will display possible errors in your note based on the variables you have selected. This validation is only offered as a suggestion for you. PLEASE NOTE THAT THE VALIDATION TEXT WILL BE REMOVED WHEN YOU FINALIZE YOUR NOTE. IF YOU WANT TO FAX A PRELIMINARY NOTE YOU WILL NEED TO TOGGLE THIS TO 'NO' IF YOU DO NOT WANT IT IN YOUR FAXED NOTE.

## 2025-01-31 NOTE — ED ADULT TRIAGE NOTE - ACCOMPANIED BY
Specialty Pharmacy Patient Management Program  Prior Authorization      Prior Authorization for [employer] was APPROVED    Approval Start Date: 01/01/25  Approval End Date: 01/31/26  Authorization / Reference / Case Number: [id number]     CoverMyMeds Key: [id number]    Scheduled new prior authorization renewal outreach task to be completed on 1/31/26    Delmy Warner PharmD, BCACP, BC-ADM, ANTHONY  Clinical Specialty Pharmacist, Endocrinology  1/31/2025  11:55 EST        Non family member